# Patient Record
Sex: FEMALE | Race: WHITE | NOT HISPANIC OR LATINO | Employment: OTHER | URBAN - METROPOLITAN AREA
[De-identification: names, ages, dates, MRNs, and addresses within clinical notes are randomized per-mention and may not be internally consistent; named-entity substitution may affect disease eponyms.]

---

## 2022-11-12 ENCOUNTER — HOSPITAL ENCOUNTER (EMERGENCY)
Facility: HOSPITAL | Age: 77
Discharge: HOME | End: 2022-11-12
Attending: EMERGENCY MEDICINE
Payer: COMMERCIAL

## 2022-11-12 VITALS
BODY MASS INDEX: 21.98 KG/M2 | SYSTOLIC BLOOD PRESSURE: 143 MMHG | HEART RATE: 77 BPM | OXYGEN SATURATION: 100 % | WEIGHT: 145 LBS | RESPIRATION RATE: 18 BRPM | TEMPERATURE: 98 F | DIASTOLIC BLOOD PRESSURE: 69 MMHG | HEIGHT: 68 IN

## 2022-11-12 DIAGNOSIS — M79.673 FOOT PAIN: ICD-10-CM

## 2022-11-12 DIAGNOSIS — M25.571 ANKLE PAIN, RIGHT: ICD-10-CM
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
## 2022-11-12 DIAGNOSIS — M25.561 KNEE PAIN, RIGHT: ICD-10-CM

## 2022-11-12 PROCEDURE — 99284 EMERGENCY DEPT VISIT MOD MDM: CPT

## 2022-11-12 RX ORDER — HYDROCODONE BITARTRATE AND ACETAMINOPHEN 5; 325 MG/1; MG/1
1 TABLET ORAL EVERY 6 HOURS PRN
Qty: 12 TABLET | Refills: 0 | Status: SHIPPED | OUTPATIENT
Start: 2022-11-12

## 2022-11-12 NOTE — DISCHARGE INSTRUCTIONS
Ice pack to the foot for 24 hours.  Elevate the foot above heart level to minimize swelling.  Use the boot immobilizer at all times except when bathing.  Use a walker for ambulation

## 2022-11-12 NOTE — ED PROVIDER NOTES
Encounter Date: 11/12/2022       History     Chief Complaint   Patient presents with    Fall    Foot Injury     77-year-old female presents with complaints of right foot pain after sustaining a mechanical fall in her home 2 days ago.  Since that time the patient has had significant swelling and ecchymosis to the right foot.  She also complained of some pain to the right knee but able to still move it.  Additionally has some mild ankle tenderness.  No complaints of any numbness to the foot or toes.  No other injuries otherwise voiced.    Review of patient's allergies indicates:   Allergen Reactions    Penicillins      No past medical history on file.  No past surgical history on file.  No family history on file.     Review of Systems   Musculoskeletal:  Positive for arthralgias, gait problem and myalgias. Negative for neck pain.   Skin:  Positive for color change. Negative for rash and wound.        Diffuse ecchymotic areas along with swelling to the dorsum of the right foot.  Peripheral pulses not palpable secondary to the edema.  Sensation is intact in the toe.  No toe tenderness.  There is evidence of metatarsal tenderness.  Achilles and mortise is intact.   Neurological:  Negative for numbness.   All other systems reviewed and are negative.    Physical Exam     Initial Vitals [11/12/22 1210]   BP Pulse Resp Temp SpO2   (!) 143/69 77 18 98 °F (36.7 °C) 100 %      MAP       --         Physical Exam    Constitutional: She appears well-developed and well-nourished.   Musculoskeletal:         General: Tenderness and edema present.     Neurological: She is alert and oriented to person, place, and time. No sensory deficit.   Examination of the right lower extremity shows no evidence of any swelling or restricted mobility to the right knee.  No ligamentous instability.  Examination of the foot and ankle reveals no significant bimalleolar tenderness.  The mortise and Achilles intact.  Patient does have significant  swelling over the foot with ecchymosis.  No tenderness over the toes.   Skin: Skin is warm and dry. Capillary refill takes less than 2 seconds.       ED Course   Procedures  Labs Reviewed - No data to display       Imaging Results              X-Ray Ankle 2 View Right (Final result)  Result time 11/12/22 13:32:14   Procedure changed from X-Ray Ankle Complete Right     Final result by Ana Maradiaga MD (11/12/22 13:32:14)                   Narrative:    Two views right ankle    Clinical history is pain    There are no fractures or acute osseous abnormalities. The ankle mortise is maintained.    IMPRESSION: Negative right ankle    Electronically signed by:  Ana Maradiaga MD  11/12/2022 1:32 PM CST Workstation: [id number]                                     X-Ray Knee 1 or 2 View Left (Final result)  Result time 11/12/22 13:31:42      Final result by Ana Maradiaga MD (11/12/22 13:31:42)                   Narrative:    Right knee 4 views    Clinical data: Pain    FINDINGS: 4 views are negative for fracture, dislocation, or osseous destructive lesion. No significant arthritic change or joint effusion is identified.    IMPRESSION:    1. Normal right knee.    Electronically signed by:  Ana Maradiaga MD  11/12/2022 1:31 PM CST Workstation: [id number]                                     X-Ray Foot Complete Right (Final result)  Result time 11/12/22 13:31:05      Final result by Ana Maradiaga MD (11/12/22 13:31:05)                   Narrative:    Three views of the right foot    Clinical history is pain and swelling after fall    There is diffuse soft tissue swelling. There are no fractures, dislocations or acute osseous abnormalities. There are mild degenerative changes of the midfoot.    IMPRESSION: Diffuse soft tissue swelling without underlying fracture    Mild degenerative changes of the midfoot    Electronically signed by:  Ana Maradiaga MD  11/12/2022 1:31 PM CST Workstation: [id number]                                      Medications - No data to display             Attending Attestation:             Attending ED Notes:   X-ray of the right knee, right ankle and right foot are negative for any acute fracture.  The patient will be placed in a boot immobilizer and advised to use a walker with ambulation.  She will be advised to elevate the extremity above heart level to minimize swelling and still keep an ice pack on it today.  Neurovascular checks of the toes.  [place] for pain and orthopedic follow-up.                 Clinical Impression:   Final diagnoses:  [M79.673] Foot pain  [M25.561] Knee pain, right  [M25.571] Ankle pain, right  [W19.XXXA] Fall, initial encounter (Primary)        ED Disposition Condition    Discharge Stable          ED Prescriptions       Medication Sig Dispense Start Date End Date Auth. Provider    HYDROcodone-acetaminophen (NORCO) 5-325 mg per tablet Take 1 tablet by mouth every 6 (six) hours as needed for Pain. 12 tablet 11/12/2022 -- Cristian Schilling Jr., MD          Follow-up Information    None          Cristian Schilling Jr., MD  11/12/22 9614

## 2022-11-12 NOTE — PLAN OF CARE
11/12/22 1521   Final Note   Assessment Type Final Discharge Note   Anticipated Discharge Disposition Home     Inpatient Consult To Social Work / Case Management - Reason For Consult: Durable Medical Equipment (Walker) From Dr. Cristian Schilling. I was told by Nurse Ethan RN that Patient was Discharged and did not want to wait for walker. I tried to call Patient at [phone] to see if they wanted to come back to get walker with no answer. I mentioned our efforts to Dr. Cristian Schilling.